# Patient Record
Sex: FEMALE | URBAN - METROPOLITAN AREA
[De-identification: names, ages, dates, MRNs, and addresses within clinical notes are randomized per-mention and may not be internally consistent; named-entity substitution may affect disease eponyms.]

---

## 2017-12-14 ENCOUNTER — TELEPHONE (OUTPATIENT)
Dept: INTERNAL MEDICINE | Facility: CLINIC | Age: 22
End: 2017-12-14

## 2023-05-15 NOTE — TELEPHONE ENCOUNTER
12/14/2017    Call Regarding [facility]att[facility]    Attempt 1    Message on voicemail    Comments:       Outreach   Arlyn Gutierrez     Impression: Diabetes mellitus Type 2 with severe non-proliferative retinopathy with macular edema, left eye: O44.5874. Plan: CSME-type DME, excellent candidate for adjunctive focal laser when DME improves; RIC OS today, treat-and-extend as able, RTC 4 weeks nDFEx/OCT mac/RIC OS.